# Patient Record
Sex: MALE | Race: OTHER | HISPANIC OR LATINO | URBAN - METROPOLITAN AREA
[De-identification: names, ages, dates, MRNs, and addresses within clinical notes are randomized per-mention and may not be internally consistent; named-entity substitution may affect disease eponyms.]

---

## 2017-11-06 ENCOUNTER — EMERGENCY (EMERGENCY)
Facility: HOSPITAL | Age: 82
LOS: 1 days | Discharge: HOME | End: 2017-11-06
Attending: EMERGENCY MEDICINE
Payer: MEDICARE

## 2017-11-06 VITALS
WEIGHT: 110.01 LBS | OXYGEN SATURATION: 98 % | TEMPERATURE: 98 F | RESPIRATION RATE: 18 BRPM | HEIGHT: 60 IN | DIASTOLIC BLOOD PRESSURE: 75 MMHG | HEART RATE: 69 BPM | SYSTOLIC BLOOD PRESSURE: 126 MMHG

## 2017-11-06 LAB
ALBUMIN SERPL ELPH-MCNC: 4.3 G/DL — SIGNIFICANT CHANGE UP (ref 3.3–5.2)
ALP SERPL-CCNC: 61 U/L — SIGNIFICANT CHANGE UP (ref 40–120)
ALT FLD-CCNC: 8 U/L — SIGNIFICANT CHANGE UP
ANION GAP SERPL CALC-SCNC: 16 MMOL/L — SIGNIFICANT CHANGE UP (ref 5–17)
AST SERPL-CCNC: 16 U/L — SIGNIFICANT CHANGE UP
BASOPHILS # BLD AUTO: 0 K/UL — SIGNIFICANT CHANGE UP (ref 0–0.2)
BASOPHILS NFR BLD AUTO: 0.1 % — SIGNIFICANT CHANGE UP (ref 0–2)
BILIRUB SERPL-MCNC: 0.4 MG/DL — SIGNIFICANT CHANGE UP (ref 0.4–2)
BUN SERPL-MCNC: 31 MG/DL — HIGH (ref 8–20)
CALCIUM SERPL-MCNC: 9.7 MG/DL — SIGNIFICANT CHANGE UP (ref 8.6–10.2)
CHLORIDE SERPL-SCNC: 99 MMOL/L — SIGNIFICANT CHANGE UP (ref 98–107)
CO2 SERPL-SCNC: 25 MMOL/L — SIGNIFICANT CHANGE UP (ref 22–29)
CREAT SERPL-MCNC: 1.94 MG/DL — HIGH (ref 0.5–1.3)
EOSINOPHIL # BLD AUTO: 0 K/UL — SIGNIFICANT CHANGE UP (ref 0–0.5)
EOSINOPHIL NFR BLD AUTO: 0.5 % — SIGNIFICANT CHANGE UP (ref 0–5)
GLUCOSE SERPL-MCNC: 161 MG/DL — HIGH (ref 70–115)
HCT VFR BLD CALC: 30.8 % — LOW (ref 42–52)
HGB BLD-MCNC: 10.4 G/DL — LOW (ref 14–18)
LYMPHOCYTES # BLD AUTO: 1 K/UL — SIGNIFICANT CHANGE UP (ref 1–4.8)
LYMPHOCYTES # BLD AUTO: 9.4 % — LOW (ref 20–55)
MAGNESIUM SERPL-MCNC: 1.7 MG/DL — SIGNIFICANT CHANGE UP (ref 1.6–2.6)
MCHC RBC-ENTMCNC: 30.7 PG — SIGNIFICANT CHANGE UP (ref 27–31)
MCHC RBC-ENTMCNC: 33.8 G/DL — SIGNIFICANT CHANGE UP (ref 32–36)
MCV RBC AUTO: 90.9 FL — SIGNIFICANT CHANGE UP (ref 80–94)
MONOCYTES # BLD AUTO: 0.3 K/UL — SIGNIFICANT CHANGE UP (ref 0–0.8)
MONOCYTES NFR BLD AUTO: 2.9 % — LOW (ref 3–10)
NEUTROPHILS # BLD AUTO: 9.4 K/UL — HIGH (ref 1.8–8)
NEUTROPHILS NFR BLD AUTO: 86.8 % — HIGH (ref 37–73)
PLATELET # BLD AUTO: 240 K/UL — SIGNIFICANT CHANGE UP (ref 150–400)
POTASSIUM SERPL-MCNC: 4.5 MMOL/L — SIGNIFICANT CHANGE UP (ref 3.5–5.3)
POTASSIUM SERPL-SCNC: 4.5 MMOL/L — SIGNIFICANT CHANGE UP (ref 3.5–5.3)
PROT SERPL-MCNC: 7.5 G/DL — SIGNIFICANT CHANGE UP (ref 6.6–8.7)
RBC # BLD: 3.39 M/UL — LOW (ref 4.6–6.2)
RBC # FLD: 13.6 % — SIGNIFICANT CHANGE UP (ref 11–15.6)
SODIUM SERPL-SCNC: 140 MMOL/L — SIGNIFICANT CHANGE UP (ref 135–145)
WBC # BLD: 10.9 K/UL — HIGH (ref 4.8–10.8)
WBC # FLD AUTO: 10.9 K/UL — HIGH (ref 4.8–10.8)

## 2017-11-06 PROCEDURE — 71020: CPT | Mod: 26

## 2017-11-06 PROCEDURE — 99284 EMERGENCY DEPT VISIT MOD MDM: CPT

## 2017-11-06 PROCEDURE — 70450 CT HEAD/BRAIN W/O DYE: CPT | Mod: 26

## 2017-11-06 PROCEDURE — 72131 CT LUMBAR SPINE W/O DYE: CPT | Mod: 26

## 2017-11-06 PROCEDURE — 73030 X-RAY EXAM OF SHOULDER: CPT | Mod: 26,LT

## 2017-11-06 PROCEDURE — 73562 X-RAY EXAM OF KNEE 3: CPT | Mod: 26,LT

## 2017-11-06 RX ORDER — SODIUM CHLORIDE 9 MG/ML
1000 INJECTION INTRAMUSCULAR; INTRAVENOUS; SUBCUTANEOUS
Qty: 0 | Refills: 0 | Status: DISCONTINUED | OUTPATIENT
Start: 2017-11-06 | End: 2017-11-11

## 2017-11-06 RX ORDER — SODIUM CHLORIDE 9 MG/ML
500 INJECTION INTRAMUSCULAR; INTRAVENOUS; SUBCUTANEOUS ONCE
Qty: 0 | Refills: 0 | Status: COMPLETED | OUTPATIENT
Start: 2017-11-06 | End: 2017-11-06

## 2017-11-06 RX ADMIN — SODIUM CHLORIDE 500 MILLILITER(S): 9 INJECTION INTRAMUSCULAR; INTRAVENOUS; SUBCUTANEOUS at 20:39

## 2017-11-06 RX ADMIN — SODIUM CHLORIDE 70 MILLILITER(S): 9 INJECTION INTRAMUSCULAR; INTRAVENOUS; SUBCUTANEOUS at 21:19

## 2017-11-06 NOTE — ED ADULT NURSE REASSESSMENT NOTE - NS ED NURSE REASSESS COMMENT FT1
Pt provided with a meal and refreshment as requested. pt comfortable on stretcher, status unchanged at this time, offers no complaints, pleasant and cooperative to care. safety measures maintained.

## 2017-11-06 NOTE — ED PROVIDER NOTE - OBJECTIVE STATEMENT
87 year old male with PMH BPH, HTN, arthritis presents with generalized weakness, joint pain and frequent falls. pt states that for 2 years he has had chronic left shoulder and knee pain, constant, worse with movement. the pain has been worsening over the past several days. In addition, pt reports that he has had frequent falls over the past several weeks. He states he "feels week" and cannot support himself.  No HA, blurry vision, numbness, tingling, fever, chills, chest pain.

## 2017-11-06 NOTE — ED ADULT TRIAGE NOTE - CHIEF COMPLAINT QUOTE
BIBA, patient reports he feels like he was going to fall today but reports he always feels nauseas and dizzy but today was worse.

## 2017-11-06 NOTE — ED ADULT NURSE REASSESSMENT NOTE - NS ED NURSE REASSESS COMMENT FT1
Report recvd from off going RN, pt recvd lying on stretcher, assisted to restroom as requested, in no apparent distress, pt POC discussed with MD, awaiting results.

## 2017-11-06 NOTE — ED PROVIDER NOTE - PROGRESS NOTE DETAILS
seen by PT seen by PT, social work contacted; patient able to ambulate well with walker; labs, ua and ct imaging reviewed, no acute pathology warranting admission; patient pleasant and cooperative; will arrange home care and hopefully senior transport to get to doctor's office for f/u; medically cleared for d/c, awaiting social work.

## 2017-11-06 NOTE — ED ADULT NURSE REASSESSMENT NOTE - NS ED NURSE REASSESS COMMENT FT1
Pt family at bedside reports pt lives at home with elderly wife who is unable to safely care for pt, reports pt had an aide come to home in past but pt fears that since senior care he will ose all his money paying for the service and aide no longer comes into home, findings reported to CLAIRE TURCIOS discussed with pt and family member who is now at bedside, pt to stay in ED overnight pending PT and Social Work in the AM, pt safety and comfort measures maintained.

## 2017-11-07 VITALS
SYSTOLIC BLOOD PRESSURE: 116 MMHG | OXYGEN SATURATION: 100 % | RESPIRATION RATE: 16 BRPM | TEMPERATURE: 98 F | DIASTOLIC BLOOD PRESSURE: 69 MMHG | HEART RATE: 60 BPM

## 2017-11-07 LAB
APPEARANCE UR: CLEAR — SIGNIFICANT CHANGE UP
BILIRUB UR-MCNC: NEGATIVE — SIGNIFICANT CHANGE UP
COLOR SPEC: YELLOW — SIGNIFICANT CHANGE UP
DIFF PNL FLD: NEGATIVE — SIGNIFICANT CHANGE UP
GLUCOSE UR QL: NEGATIVE MG/DL — SIGNIFICANT CHANGE UP
KETONES UR-MCNC: NEGATIVE — SIGNIFICANT CHANGE UP
LEUKOCYTE ESTERASE UR-ACNC: NEGATIVE — SIGNIFICANT CHANGE UP
NITRITE UR-MCNC: NEGATIVE — SIGNIFICANT CHANGE UP
PH UR: 5 — SIGNIFICANT CHANGE UP (ref 5–8)
PROT UR-MCNC: 15 MG/DL
SP GR SPEC: 1.01 — SIGNIFICANT CHANGE UP (ref 1.01–1.02)
UROBILINOGEN FLD QL: NEGATIVE MG/DL — SIGNIFICANT CHANGE UP

## 2017-11-07 PROCEDURE — 73562 X-RAY EXAM OF KNEE 3: CPT

## 2017-11-07 PROCEDURE — 85027 COMPLETE CBC AUTOMATED: CPT

## 2017-11-07 PROCEDURE — 73030 X-RAY EXAM OF SHOULDER: CPT

## 2017-11-07 PROCEDURE — 70450 CT HEAD/BRAIN W/O DYE: CPT

## 2017-11-07 PROCEDURE — 99284 EMERGENCY DEPT VISIT MOD MDM: CPT | Mod: 25

## 2017-11-07 PROCEDURE — T1013: CPT

## 2017-11-07 PROCEDURE — 83735 ASSAY OF MAGNESIUM: CPT

## 2017-11-07 PROCEDURE — 71046 X-RAY EXAM CHEST 2 VIEWS: CPT

## 2017-11-07 PROCEDURE — 36415 COLL VENOUS BLD VENIPUNCTURE: CPT

## 2017-11-07 PROCEDURE — 81001 URINALYSIS AUTO W/SCOPE: CPT

## 2017-11-07 PROCEDURE — 72131 CT LUMBAR SPINE W/O DYE: CPT

## 2017-11-07 PROCEDURE — 80053 COMPREHEN METABOLIC PANEL: CPT

## 2017-11-07 NOTE — DISCHARGE NOTE ADULT - PATIENT PORTAL LINK FT
“You can access the [employer] Patient Portal, offered by [facility], by registering with the following website: [email or web address]”

## 2017-11-07 NOTE — ED ADULT NURSE REASSESSMENT NOTE - NS ED NURSE REASSESS COMMENT FT1
Pt seen by PT , cleared for discharge home , SW at the bedside earlier, pt will be going home with HHA set up by JEISON, awaiting nephew to come for discharge

## 2017-11-07 NOTE — PROVIDER CONTACT NOTE (OTHER) - ASSESSMENT
Pt is functionally at baseline level of function, and not in need of skilled PT, will no longer follow . Pt left supine in bed in no apparent distress and call bell within reach.

## 2017-11-07 NOTE — PHYSICAL THERAPY INITIAL EVALUATION ADULT - ADDITIONAL COMMENTS
Pt lives in a 2 story house with no steps to enter.  Amb with RW and Modified Independent with ADLs and self care

## 2017-11-07 NOTE — ED ADULT NURSE REASSESSMENT NOTE - NS ED NURSE REASSESS COMMENT FT1
Pt resting comfortably on stretcher, offers no complaints at this time, comfort measures provided including a warm blanket, safety measures maintained. Pt encouraged to sleep awaiting PT consult in am.  POC explained to patient via . Pt verbalized understanding.

## 2017-11-07 NOTE — ED BEHAVIORAL HEALTH NOTE - BEHAVIORAL HEALTH NOTE
Social work note:  Worker consulted by Dr. Hendrix. Dr. Hendrix concerned about how pt has been getting to doctors appointments.  Pt resides with his family but reports they are not always available to assist. Worker provided pt with [facility] as well as medicaid transportation application for pt's PCP to complete. Pt provided verbal understanding. No other social work needs at this time.

## 2017-11-07 NOTE — PHYSICAL THERAPY INITIAL EVALUATION ADULT - REHAB POTENTIAL, PT EVAL
Pt is functionally at baseline level of function and not in need of skilled PT, will no longer follow/none

## 2018-05-01 ENCOUNTER — OUTPATIENT (OUTPATIENT)
Dept: OUTPATIENT SERVICES | Facility: HOSPITAL | Age: 83
LOS: 1 days | End: 2018-05-01
Payer: MEDICAID

## 2018-05-21 ENCOUNTER — INPATIENT (INPATIENT)
Facility: HOSPITAL | Age: 83
LOS: 1 days | Discharge: HOME | DRG: 310 | End: 2018-05-23
Attending: INTERNAL MEDICINE | Admitting: INTERNAL MEDICINE
Payer: MEDICARE

## 2018-05-21 VITALS
OXYGEN SATURATION: 98 % | HEIGHT: 67 IN | TEMPERATURE: 99 F | SYSTOLIC BLOOD PRESSURE: 135 MMHG | HEART RATE: 49 BPM | WEIGHT: 145.06 LBS | DIASTOLIC BLOOD PRESSURE: 92 MMHG | RESPIRATION RATE: 20 BRPM

## 2018-05-21 DIAGNOSIS — R00.1 BRADYCARDIA, UNSPECIFIED: ICD-10-CM

## 2018-05-21 DIAGNOSIS — N18.3 CHRONIC KIDNEY DISEASE, STAGE 3 (MODERATE): ICD-10-CM

## 2018-05-21 DIAGNOSIS — R55 SYNCOPE AND COLLAPSE: ICD-10-CM

## 2018-05-21 DIAGNOSIS — I10 ESSENTIAL (PRIMARY) HYPERTENSION: ICD-10-CM

## 2018-05-21 DIAGNOSIS — D64.9 ANEMIA, UNSPECIFIED: ICD-10-CM

## 2018-05-21 LAB
ANION GAP SERPL CALC-SCNC: 15 MMOL/L — SIGNIFICANT CHANGE UP (ref 5–17)
APTT BLD: 28.6 SEC — SIGNIFICANT CHANGE UP (ref 27.5–37.4)
BUN SERPL-MCNC: 31 MG/DL — HIGH (ref 8–20)
CALCIUM SERPL-MCNC: 9.3 MG/DL — SIGNIFICANT CHANGE UP (ref 8.6–10.2)
CHLORIDE SERPL-SCNC: 98 MMOL/L — SIGNIFICANT CHANGE UP (ref 98–107)
CK SERPL-CCNC: 119 U/L — SIGNIFICANT CHANGE UP (ref 30–200)
CK SERPL-CCNC: 95 U/L — SIGNIFICANT CHANGE UP (ref 30–200)
CO2 SERPL-SCNC: 22 MMOL/L — SIGNIFICANT CHANGE UP (ref 22–29)
CREAT SERPL-MCNC: 1.32 MG/DL — HIGH (ref 0.5–1.3)
GLUCOSE SERPL-MCNC: 142 MG/DL — HIGH (ref 70–115)
HCT VFR BLD CALC: 31.3 % — LOW (ref 42–52)
HGB BLD-MCNC: 10.1 G/DL — LOW (ref 14–18)
INR BLD: 1.01 RATIO — SIGNIFICANT CHANGE UP (ref 0.88–1.16)
MAGNESIUM SERPL-MCNC: 1.9 MG/DL — SIGNIFICANT CHANGE UP (ref 1.8–2.6)
MCHC RBC-ENTMCNC: 30.4 PG — SIGNIFICANT CHANGE UP (ref 27–31)
MCHC RBC-ENTMCNC: 32.3 G/DL — SIGNIFICANT CHANGE UP (ref 32–36)
MCV RBC AUTO: 94.3 FL — HIGH (ref 80–94)
PLATELET # BLD AUTO: 264 K/UL — SIGNIFICANT CHANGE UP (ref 150–400)
POTASSIUM SERPL-MCNC: 4.4 MMOL/L — SIGNIFICANT CHANGE UP (ref 3.5–5.3)
POTASSIUM SERPL-SCNC: 4.4 MMOL/L — SIGNIFICANT CHANGE UP (ref 3.5–5.3)
PROTHROM AB SERPL-ACNC: 11.1 SEC — SIGNIFICANT CHANGE UP (ref 9.8–12.7)
RBC # BLD: 3.32 M/UL — LOW (ref 4.6–6.2)
RBC # FLD: 14 % — SIGNIFICANT CHANGE UP (ref 11–15.6)
SODIUM SERPL-SCNC: 135 MMOL/L — SIGNIFICANT CHANGE UP (ref 135–145)
TROPONIN T SERPL-MCNC: 0.01 NG/ML — SIGNIFICANT CHANGE UP (ref 0–0.06)
TROPONIN T SERPL-MCNC: 0.02 NG/ML — SIGNIFICANT CHANGE UP (ref 0–0.06)
TSH SERPL-MCNC: 1.97 UIU/ML — SIGNIFICANT CHANGE UP (ref 0.27–4.2)
WBC # BLD: 6.8 K/UL — SIGNIFICANT CHANGE UP (ref 4.8–10.8)
WBC # FLD AUTO: 6.8 K/UL — SIGNIFICANT CHANGE UP (ref 4.8–10.8)

## 2018-05-21 PROCEDURE — 99223 1ST HOSP IP/OBS HIGH 75: CPT | Mod: AI

## 2018-05-21 PROCEDURE — 93306 TTE W/DOPPLER COMPLETE: CPT | Mod: 26

## 2018-05-21 PROCEDURE — 70450 CT HEAD/BRAIN W/O DYE: CPT | Mod: 26

## 2018-05-21 PROCEDURE — 99223 1ST HOSP IP/OBS HIGH 75: CPT

## 2018-05-21 PROCEDURE — 99291 CRITICAL CARE FIRST HOUR: CPT

## 2018-05-21 PROCEDURE — 71045 X-RAY EXAM CHEST 1 VIEW: CPT | Mod: 26

## 2018-05-21 RX ORDER — TAMSULOSIN HYDROCHLORIDE 0.4 MG/1
0.4 CAPSULE ORAL AT BEDTIME
Qty: 0 | Refills: 0 | Status: DISCONTINUED | OUTPATIENT
Start: 2018-05-21 | End: 2018-05-23

## 2018-05-21 RX ORDER — TRAMADOL HYDROCHLORIDE 50 MG/1
25 TABLET ORAL
Qty: 0 | Refills: 0 | COMMUNITY

## 2018-05-21 RX ORDER — TRAMADOL HYDROCHLORIDE 50 MG/1
25 TABLET ORAL
Qty: 0 | Refills: 0 | Status: DISCONTINUED | OUTPATIENT
Start: 2018-05-21 | End: 2018-05-23

## 2018-05-21 RX ORDER — SODIUM CHLORIDE 9 MG/ML
1000 INJECTION INTRAMUSCULAR; INTRAVENOUS; SUBCUTANEOUS
Qty: 0 | Refills: 0 | Status: DISCONTINUED | OUTPATIENT
Start: 2018-05-21 | End: 2018-05-23

## 2018-05-21 RX ORDER — HEPARIN SODIUM 5000 [USP'U]/ML
5000 INJECTION INTRAVENOUS; SUBCUTANEOUS EVERY 8 HOURS
Qty: 0 | Refills: 0 | Status: DISCONTINUED | OUTPATIENT
Start: 2018-05-21 | End: 2018-05-23

## 2018-05-21 RX ORDER — LISINOPRIL 2.5 MG/1
20 TABLET ORAL DAILY
Qty: 0 | Refills: 0 | Status: DISCONTINUED | OUTPATIENT
Start: 2018-05-21 | End: 2018-05-23

## 2018-05-21 RX ORDER — ACETAMINOPHEN 500 MG
650 TABLET ORAL EVERY 6 HOURS
Qty: 0 | Refills: 0 | Status: DISCONTINUED | OUTPATIENT
Start: 2018-05-21 | End: 2018-05-23

## 2018-05-21 RX ORDER — SODIUM CHLORIDE 9 MG/ML
3 INJECTION INTRAMUSCULAR; INTRAVENOUS; SUBCUTANEOUS ONCE
Qty: 0 | Refills: 0 | Status: COMPLETED | OUTPATIENT
Start: 2018-05-21 | End: 2018-05-21

## 2018-05-21 RX ORDER — AMLODIPINE BESYLATE 2.5 MG/1
10 TABLET ORAL DAILY
Qty: 0 | Refills: 0 | Status: DISCONTINUED | OUTPATIENT
Start: 2018-05-21 | End: 2018-05-23

## 2018-05-21 RX ADMIN — SODIUM CHLORIDE 3 MILLILITER(S): 9 INJECTION INTRAMUSCULAR; INTRAVENOUS; SUBCUTANEOUS at 12:43

## 2018-05-21 RX ADMIN — HEPARIN SODIUM 5000 UNIT(S): 5000 INJECTION INTRAVENOUS; SUBCUTANEOUS at 14:23

## 2018-05-21 RX ADMIN — SODIUM CHLORIDE 75 MILLILITER(S): 9 INJECTION INTRAMUSCULAR; INTRAVENOUS; SUBCUTANEOUS at 18:44

## 2018-05-21 NOTE — CONSULT NOTE ADULT - PROBLEM SELECTOR RECOMMENDATION 9
NPO after midnight. Nuclear stress test   Transthoracic echocardiogram   loop recorder placemant tomorrow if work up is negative.   overnight telemetry.  IV fluids.

## 2018-05-21 NOTE — H&P ADULT - ASSESSMENT
87 yo M w/ hx HTN, osteoarthritis presents for syncopal episode at home. Found to have symptomatic bradycardia.

## 2018-05-21 NOTE — ED PROVIDER NOTE - OBJECTIVE STATEMENT
87 y/o M pt with a hx fof HTN presents to the ED with c/o syncopal episode today. As per son, Pt was eating his breakfast, started feeling nauseous and vomited. After vomiting, pt sat down and "had a blank stare", he passed out and was falling off his chair. Pt however doesn't recall the episode of vomiting. Pt is also experiencing dizziness and CP which he states he has been experiencing for several days now and has wanted to come in to the ED. Pt states he takes his BP medication every time he feels like his BP is up. No further complaints at this time.

## 2018-05-21 NOTE — ED ADULT NURSE NOTE - OBJECTIVE STATEMENT
Assumed pt care at 1245.  Pt awake, alert and oriented x3 c/o syncopal episode today while eating breakfast, pt states he he was sitting in chair and slid to ground,and episode only lasted for a few moments but does not recall details of event.  Denies injury from fall.  Denies chest pain or shortness of breath.  Moving all extremities well.  Respirations even and unlabored, no edema.  Abdomen soft, nontender, nondistended. No signs of distress at this time.  Sinus cathy on cardiac monitor.

## 2018-05-21 NOTE — ED PROVIDER NOTE - CRITICAL CARE INDICATION, MLM
patient was critically ill... Patient was critically ill with a high probability of imminent or life threatening deterioration. STAT IV, EKG, CT HEAD, XRAY, LAB CARDIO CONSULT ADMIT

## 2018-05-21 NOTE — ED PROVIDER NOTE - MEDICAL DECISION MAKING DETAILS
pt with syncope and bradycardia. likely 2ndary to beta  blocker non-compliance, will check labs and monitor

## 2018-05-21 NOTE — ED PROVIDER NOTE - CRITICAL CARE PROVIDED
45 MINUTES/direct patient care (not related to procedure)/documentation/consultation with other physicians/interpretation of diagnostic studies

## 2018-05-21 NOTE — H&P ADULT - HISTORY OF PRESENT ILLNESS
87 yo M w/ hx HTN, osteoarthritis presents for syncopal episode at home.  patient is poor historian and with  states he passed out during breakfast and was unresponsive for "2 hours". denies preceding chest pain or shortness of breath.  per son who witnessed event, states patient finished breakfast and was unresponsive in chair for 2 minutes with post ictal state. no loss of bowel/urine control or tongue biting/seizure like activity.  patient is on atenolol 100mg daily and combination diuretic (triamterene-HCTZ).

## 2018-05-21 NOTE — ED ADULT NURSE REASSESSMENT NOTE - NS ED NURSE REASSESS COMMENT FT1
Report given to MANUEL Felix, prior to going to HR pt transported to ECHO  on monitor, accompanied by radiology RN.  Will bring to holding room when test is completed.

## 2018-05-21 NOTE — CONSULT NOTE ADULT - SUBJECTIVE AND OBJECTIVE BOX
[place] [facility]                                                        Office: [address] [address]                                                       Telephone: [phone]. Fax:[phone]                                                              CARDIOLOGY CONSULTATION NOTE                                                                                             Consult requested by:  Bandar Toure (Hospitalist)  Reason for Consultation: syncope and bradycardia    History obtained by: Patient and medical record     obtained: No    Chief complaint:    Patient is a 88y old  Male who presents with a chief complaint of syncope (21 May 2018 13:48)      HPI:  87 yo M w/ hx HTN, osteoarthritis presents for syncopal episode at home.  patient is poor historian and with  states he passed out during breakfast and was unresponsive for "2 hours". denies preceding chest pain or shortness of breath.  per son who witnessed event, states patient finished breakfast and was unresponsive in chair for 2 minutes with post ictal state. no loss of bowel/urine control or tongue biting/seizure like activity.  patient is on atenolol 100mg daily and combination diuretic (triamterene-HCTZ). (21 May 2018 13:48)    Above history reviewed and agreed.   This is a 88 year old male with ..htn and osteoarthritis, with syncope.        REVIEW OF SYMPTOMS: Cardiovascular:  See HPI. No chest pain,  No dyspnea,  +  syncope,  No palpitations,+  dizziness, No Orthopnea,      No Paroxsymal nocturnal dyspnea;  Respiratory:  No Dyspnea, No cough,     Genitourinary:  No dysuria, no hematuria; Gastrointestinal:  No nausea, no vomiting. No diarrhea.  No abdominal pain. No dark color stool, no melena ; Neurological: No headache, no dizziness, no slurred speech;  Psychiatric: No agitation, no anxiety.  ALL OTHER REVIEW OF SYSTEMS ARE NEGATIVE.    ALLERGIES: Allergies    latex (Unknown)  No Known Drug Allergies    Intolerances          CURRENT MEDICATIONS:  amLODIPine   Tablet 10 milliGRAM(s) Oral daily  lisinopril 20 milliGRAM(s) Oral daily  tamsulosin 0.4 milliGRAM(s) Oral at bedtime     heparin  Injectable      HOME MEDICATIONS:    PAST MEDICAL HISTORY  BPH (benign prostatic hyperplasia)  Gastritis  Hypertension  Arthritis      PAST SURGICAL HISTORY  S/P cholecystectomy      FAMILY HISTORY:  No pertinent family history in first degree relatives      SOCIAL HISTORY:  Denies smoking/alcohol/drugs      Vital Signs Last 24 Hrs  T(C): 37.1 (21 May 2018 11:21), Max: 37.1 (21 May 2018 11:21)  T(F): 98.8 (21 May 2018 11:21), Max: 98.8 (21 May 2018 11:21)  HR: 47 (21 May 2018 12:41) (47 - 49)  BP: 135/92 (21 May 2018 11:21) (135/92 - 135/92)  BP(mean): --  RR: 20 (21 May 2018 11:21) (20 - 20)  SpO2: 98% (21 May 2018 11:21) (98% - 98%)      PHYSICAL EXAM:  Constitutional: Comfortable . No acute distress.   HEENT: Atraumatic and normcephalic , neck is supple . no JVD. No carotid bruit. PEERL   CNS: A&Ox3. No focal deficits. EOMI. Cranial nerves II-IX are intact.   Lymph Nodes: Cervical : Not palpable.  Respiratory: CTAB  Cardiovascular: S1S2 RRR. No murmur/rubs or gallop.  Gastrointestinal: Soft non-tender and non distended . +Bowel sounds. negative Polanco's sign.  Extremities: No edema.   Psychiatric: Calm . no agitation.  Skin: No skin rash/ulcers visualized to face, hands or feet.    Intake and output:     LABS:                        10.1   6.8   )-----------( 264      ( 21 May 2018 12:01 )             31.3     05-21    135  |  98  |  31.0<H>  ----------------------------<  142<H>  4.4   |  22.0  |  1.32<H>    Ca    9.3      21 May 2018 12:01      CARDIAC MARKERS ( 21 May 2018 12:01 )  x     / 0.02 ng/mL / 95 U/L / x     / x        ;p-BNP=  PT/INR - ( 21 May 2018 12:01 )   PT: 11.1 sec;   INR: 1.01 ratio         PTT - ( 21 May 2018 12:01 )  PTT:28.6 sec      INTERPRETATION OF TELEMETRY: Reviewed by me.   ECG: Reviewed by me. Sinus bradycardia.     RADIOLOGY & ADDITIONAL STUDIES:    X-ray:  reviewed by me.  unremarkable [place] [facility]                                                        Office: [address] [address]                                                       Telephone: [phone]. Fax:[phone]                                                              CARDIOLOGY CONSULTATION NOTE                                                                                             Consult requested by:  Bandar Toure (Hospitalist)  Reason for Consultation: syncope and bradycardia    History obtained by: Patient and medical record     obtained: .    Chief complaint:    Patient is a 88y old  Male who presents with a chief complaint of syncope (21 May 2018 13:48)      HPI:  89 yo M w/ hx HTN, osteoarthritis presents for syncopal episode at home.  patient is poor historian and with  states he passed out during breakfast and was unresponsive for "2 hours". denies preceding chest pain or shortness of breath.  per son who witnessed event, states patient finished breakfast and was unresponsive in chair for 2 minutes with post ictal state. no loss of bowel/urine control or tongue biting/seizure like activity.  patient is on atenolol 100mg daily and combination diuretic (triamterene-HCTZ). (21 May 2018 13:48)    Above history reviewed and agreed.   This is a 88 year old male with ..htn and osteoarthritis, with syncope.  Patient stats he has been losing weight and lost > 15 lbs. decrease apetitie.  family history of cancer.    Denies any chest pain on exertion. no dyspnea on exertion.         REVIEW OF SYMPTOMS: Cardiovascular:  See HPI. No chest pain,  No dyspnea,  +  syncope,  No palpitations,+  dizziness, No Orthopnea,      No Paroxsymal nocturnal dyspnea;  Respiratory:  No Dyspnea, No cough,     Genitourinary:  No dysuria, no hematuria; Gastrointestinal:  No nausea, no vomiting. No diarrhea.  No abdominal pain. No dark color stool, no melena ; Neurological: No headache, no dizziness, no slurred speech;  Psychiatric: No agitation, no anxiety.  ALL OTHER REVIEW OF SYSTEMS ARE NEGATIVE.    ALLERGIES: Allergies    latex (Unknown)  No Known Drug Allergies    Intolerances          CURRENT MEDICATIONS:  amLODIPine   Tablet 10 milliGRAM(s) Oral daily  lisinopril 20 milliGRAM(s) Oral daily  tamsulosin 0.4 milliGRAM(s) Oral at bedtime     heparin  Injectable      HOME MEDICATIONS:    PAST MEDICAL HISTORY  BPH (benign prostatic hyperplasia)  Gastritis  Hypertension  Arthritis      PAST SURGICAL HISTORY  S/P cholecystectomy      FAMILY HISTORY:  No pertinent family history in first degree relatives      SOCIAL HISTORY:  Denies smoking/alcohol/drugs      Vital Signs Last 24 Hrs  T(C): 37.1 (21 May 2018 11:21), Max: 37.1 (21 May 2018 11:21)  T(F): 98.8 (21 May 2018 11:21), Max: 98.8 (21 May 2018 11:21)  HR: 47 (21 May 2018 12:41) (47 - 49)  BP: 135/92 (21 May 2018 11:21) (135/92 - 135/92)  BP(mean): --  RR: 20 (21 May 2018 11:21) (20 - 20)  SpO2: 98% (21 May 2018 11:21) (98% - 98%)      PHYSICAL EXAM:  Constitutional: Comfortable . No acute distress.   HEENT: Atraumatic and normcephalic , neck is supple . no JVD. No carotid bruit. PEERL  cachetic malnourished.   CNS: A&Ox3. No focal deficits. EOMI. Cranial nerves II-IX are intact.   Lymph Nodes: Cervical : Not palpable.  Respiratory: CTAB  Cardiovascular: S1S2 RRR. No murmur/rubs or gallop.  Gastrointestinal: Soft non-tender and non distended . +Bowel sounds. negative Polanco's sign.  Extremities: No edema.   Psychiatric: Calm . no agitation.  Skin: No skin rash/ulcers visualized to face, hands or feet.    Intake and output:     LABS:                        10.1   6.8   )-----------( 264      ( 21 May 2018 12:01 )             31.3     05-21    135  |  98  |  31.0<H>  ----------------------------<  142<H>  4.4   |  22.0  |  1.32<H>    Ca    9.3      21 May 2018 12:01      CARDIAC MARKERS ( 21 May 2018 12:01 )  x     / 0.02 ng/mL / 95 U/L / x     / x        ;p-BNP=  PT/INR - ( 21 May 2018 12:01 )   PT: 11.1 sec;   INR: 1.01 ratio         PTT - ( 21 May 2018 12:01 )  PTT:28.6 sec      INTERPRETATION OF TELEMETRY: Reviewed by me.   ECG: Reviewed by me. Sinus bradycardia.     RADIOLOGY & ADDITIONAL STUDIES:    X-ray:  reviewed by me.  unremarkable

## 2018-05-21 NOTE — CONSULT NOTE ADULT - ASSESSMENT
87 yo M w/ hx HTN, osteoarthritis presents for syncopal episode at home with nause and vomiting and gernalized body aches

## 2018-05-21 NOTE — ED ADULT TRIAGE NOTE - CHIEF COMPLAINT QUOTE
Patient brought in by ambulance A/Ox3, reports syncopal episode this morning will eating breakfast, pt. hypotensive.

## 2018-05-22 LAB
ANION GAP SERPL CALC-SCNC: 14 MMOL/L — SIGNIFICANT CHANGE UP (ref 5–17)
APPEARANCE UR: CLEAR — SIGNIFICANT CHANGE UP
BILIRUB UR-MCNC: NEGATIVE — SIGNIFICANT CHANGE UP
BUN SERPL-MCNC: 24 MG/DL — HIGH (ref 8–20)
CALCIUM SERPL-MCNC: 8.4 MG/DL — LOW (ref 8.6–10.2)
CHLORIDE SERPL-SCNC: 100 MMOL/L — SIGNIFICANT CHANGE UP (ref 98–107)
CO2 SERPL-SCNC: 22 MMOL/L — SIGNIFICANT CHANGE UP (ref 22–29)
COLOR SPEC: YELLOW — SIGNIFICANT CHANGE UP
CREAT SERPL-MCNC: 0.99 MG/DL — SIGNIFICANT CHANGE UP (ref 0.5–1.3)
DIFF PNL FLD: NEGATIVE — SIGNIFICANT CHANGE UP
FERRITIN SERPL-MCNC: 273 NG/ML — SIGNIFICANT CHANGE UP (ref 30–400)
GLUCOSE SERPL-MCNC: 96 MG/DL — SIGNIFICANT CHANGE UP (ref 70–115)
GLUCOSE UR QL: NEGATIVE MG/DL — SIGNIFICANT CHANGE UP
HCT VFR BLD CALC: 30.2 % — LOW (ref 42–52)
HGB BLD-MCNC: 9.8 G/DL — LOW (ref 14–18)
IRON SATN MFR SERPL: 25 % — SIGNIFICANT CHANGE UP (ref 16–55)
IRON SATN MFR SERPL: 58 UG/DL — LOW (ref 59–158)
KETONES UR-MCNC: NEGATIVE — SIGNIFICANT CHANGE UP
LEUKOCYTE ESTERASE UR-ACNC: NEGATIVE — SIGNIFICANT CHANGE UP
MCHC RBC-ENTMCNC: 30.6 PG — SIGNIFICANT CHANGE UP (ref 27–31)
MCHC RBC-ENTMCNC: 32.5 G/DL — SIGNIFICANT CHANGE UP (ref 32–36)
MCV RBC AUTO: 94.4 FL — HIGH (ref 80–94)
NITRITE UR-MCNC: NEGATIVE — SIGNIFICANT CHANGE UP
PH UR: 7 — SIGNIFICANT CHANGE UP (ref 5–8)
PLATELET # BLD AUTO: 258 K/UL — SIGNIFICANT CHANGE UP (ref 150–400)
POTASSIUM SERPL-MCNC: 3.6 MMOL/L — SIGNIFICANT CHANGE UP (ref 3.5–5.3)
POTASSIUM SERPL-SCNC: 3.6 MMOL/L — SIGNIFICANT CHANGE UP (ref 3.5–5.3)
PROT UR-MCNC: NEGATIVE MG/DL — SIGNIFICANT CHANGE UP
RBC # BLD: 3.2 M/UL — LOW (ref 4.6–6.2)
RBC # FLD: 13.4 % — SIGNIFICANT CHANGE UP (ref 11–15.6)
SODIUM SERPL-SCNC: 136 MMOL/L — SIGNIFICANT CHANGE UP (ref 135–145)
SP GR SPEC: 1 — LOW (ref 1.01–1.02)
TIBC SERPL-MCNC: 235 UG/DL — SIGNIFICANT CHANGE UP (ref 220–430)
TRANSFERRIN SERPL-MCNC: 164 MG/DL — LOW (ref 180–329)
UROBILINOGEN FLD QL: NEGATIVE MG/DL — SIGNIFICANT CHANGE UP
VIT B12 SERPL-MCNC: 318 PG/ML — SIGNIFICANT CHANGE UP (ref 232–1245)
WBC # BLD: 7.1 K/UL — SIGNIFICANT CHANGE UP (ref 4.8–10.8)
WBC # FLD AUTO: 7.1 K/UL — SIGNIFICANT CHANGE UP (ref 4.8–10.8)

## 2018-05-22 PROCEDURE — 78452 HT MUSCLE IMAGE SPECT MULT: CPT | Mod: 26

## 2018-05-22 PROCEDURE — 99223 1ST HOSP IP/OBS HIGH 75: CPT

## 2018-05-22 PROCEDURE — 93016 CV STRESS TEST SUPVJ ONLY: CPT

## 2018-05-22 PROCEDURE — 93018 CV STRESS TEST I&R ONLY: CPT

## 2018-05-22 PROCEDURE — 99233 SBSQ HOSP IP/OBS HIGH 50: CPT

## 2018-05-22 RX ORDER — HALOPERIDOL DECANOATE 100 MG/ML
1 INJECTION INTRAMUSCULAR ONCE
Qty: 0 | Refills: 0 | Status: COMPLETED | OUTPATIENT
Start: 2018-05-22 | End: 2018-05-22

## 2018-05-22 RX ORDER — PREGABALIN 225 MG/1
1000 CAPSULE ORAL DAILY
Qty: 0 | Refills: 0 | Status: DISCONTINUED | OUTPATIENT
Start: 2018-05-22 | End: 2018-05-23

## 2018-05-22 RX ADMIN — HEPARIN SODIUM 5000 UNIT(S): 5000 INJECTION INTRAVENOUS; SUBCUTANEOUS at 13:41

## 2018-05-22 RX ADMIN — SODIUM CHLORIDE 75 MILLILITER(S): 9 INJECTION INTRAMUSCULAR; INTRAVENOUS; SUBCUTANEOUS at 05:29

## 2018-05-22 RX ADMIN — HEPARIN SODIUM 5000 UNIT(S): 5000 INJECTION INTRAVENOUS; SUBCUTANEOUS at 00:05

## 2018-05-22 RX ADMIN — HEPARIN SODIUM 5000 UNIT(S): 5000 INJECTION INTRAVENOUS; SUBCUTANEOUS at 05:29

## 2018-05-22 RX ADMIN — TAMSULOSIN HYDROCHLORIDE 0.4 MILLIGRAM(S): 0.4 CAPSULE ORAL at 00:05

## 2018-05-22 RX ADMIN — HALOPERIDOL DECANOATE 1 MILLIGRAM(S): 100 INJECTION INTRAMUSCULAR at 23:17

## 2018-05-22 RX ADMIN — LISINOPRIL 20 MILLIGRAM(S): 2.5 TABLET ORAL at 05:29

## 2018-05-22 RX ADMIN — AMLODIPINE BESYLATE 10 MILLIGRAM(S): 2.5 TABLET ORAL at 05:29

## 2018-05-22 NOTE — CONSULT NOTE ADULT - SUBJECTIVE AND OBJECTIVE BOX
HPI:  87 yo M seen with son at bedside; h/o HTN, rheumatoid arthritis, possible remote PCI at [facility] (per son - details unclear) presents for syncopal episode at home while eating breakfast. The event was witnessed by his son, who states patient suddenly slumped over in his chair and was unresponsive for approximately 2 minutes. 911 was called and patient was brought to [facility] ED via ambulance. On presentation, the patient was noted to be in sinus rhythm at 40s bpm w/ intact conduction. Per son, denies prodrome and no loss of bowel/urine control or tongue biting/seizure like activity were observed. Patient was on atenolol 100mg daily and combination diuretic (triamterene-HCTZ) - his son has concerns that he may mix up medications at times. Patient notes occasional palpitation, described as brief fluttering, sometimes associated with dizziness. Also notes recent weight loss (unintentional) >15 lbs 2/2 decreased appetite. Denies chest pain, shortness of breath at rest, prior episodes of near/true syncope, or other cardiac symptoms. Pt is somewhat tangential and very concerned about roommates cough. EP is consulted for possible ILR implant.     Tele (since admission to 4 TWR this AM): sinus rhythm at 50s-70s bpm w/ intact conduction; no pauses, arrhythmias or other acute changes.     PAST MEDICAL & SURGICAL HISTORY:  BPH (benign prostatic hyperplasia)  Gastritis  Hypertension  Arthritis  S/P cholecystectomy      REVIEW OF SYSTEMS:  CONSTITUTIONAL: see HPI  EYES: No eye pain, visual disturbances, or discharge  ENMT:  No difficulty hearing, tinnitus, vertigo; No sinus or throat pain  NECK: No pain or stiffness  BREASTS: No pain, masses, or nipple discharge  RESPIRATORY: No cough, wheezing, chills or hemoptysis; No shortness of breath  CARDIOVASCULAR: see HPI  GASTROINTESTINAL: No abdominal or epigastric pain. No nausea, vomiting, or hematemesis; No diarrhea or constipation. No melena or hematochezia.  GENITOURINARY: No dysuria, frequency, hematuria, or incontinence  NEUROLOGICAL: No headaches, memory loss, loss of strength, numbness, or tremors  SKIN: No itching, burning, rashes, or lesions   LYMPH NODES: No enlarged glands  ENDOCRINE: No heat or cold intolerance; No hair loss  MUSCULOSKELETAL: No joint pain or swelling; No muscle, back, or extremity pain  PSYCHIATRIC: No depression, anxiety, mood swings, or difficulty sleeping  HEME/LYMPH: No easy bruising, or bleeding gums  ALLERY AND IMMUNOLOGIC: No hives or eczema      MEDICATIONS  (STANDING):  amLODIPine   Tablet 10 milliGRAM(s) Oral daily  cyanocobalamin Injectable 1000 MICROGram(s) SubCutaneous daily  heparin  Injectable 5000 Unit(s) SubCutaneous every 8 hours  lisinopril 20 milliGRAM(s) Oral daily  sodium chloride 0.9%. 1000 milliLiter(s) (75 mL/Hr) IV Continuous <Continuous>  tamsulosin 0.4 milliGRAM(s) Oral at bedtime    MEDICATIONS  (PRN):  acetaminophen   Tablet. 650 milliGRAM(s) Oral every 6 hours PRN Mild Pain (1 - 3) or fever 38C  traMADol 25 milliGRAM(s) Oral four times a day PRN mod -severe pain      Allergies  No Known Allergies    FAMILY HISTORY:  No pertinent family history in first degree relatives      Vital Signs Last 24 Hrs  T(C): 36.6 (22 May 2018 12:25), Max: 37 (22 May 2018 09:59)  T(F): 97.9 (22 May 2018 12:25), Max: 98.6 (22 May 2018 09:59)  HR: 59 (22 May 2018 12:25) (57 - 71)  BP: 134/60 (22 May 2018 12:25) (123/58 - 177/84)  RR: 17 (22 May 2018 12:25) (14 - 20)  SpO2: 100% (22 May 2018 12:25) (95% - 100%)      Physical Exam:  Constitutional: thin/frail appearing, AAOx3, NAD  Neck: supple, No JVD  Cardiovascular: +S1S2 RRR, no murmurs, rubs, gallops   Pulmonary: CTA b/l, unlabored, no wheezes, rales. rhonci  Abdomen: +BS, soft NTND  Extremities: no edema b/l, +distal pulses b/l  Neuro: non focal, speech clear, GUAJARDO x 4      LABS:                        9.8    7.1   )-----------( 258      ( 22 May 2018 11:29 )             30.2   05-22    136  |  100  |  24.0<H>  ----------------------------<  96  3.6   |  22.0  |  0.99    Ca    8.4<L>      22 May 2018 11:29  Mg     1.9           PT/INR - ( 21 May 2018 12:01 )   PT: 11.1 sec;   INR: 1.01 ratio         PTT - ( 21 May 2018 12:01 )  PTT:28.6 secCARDIAC MARKERS ( 21 May 2018 18:26 )  x     / 0.01 ng/mL / 119 U/L / x     / x      CARDIAC MARKERS ( 21 May 2018 12:01 )  x     / 0.02 ng/mL / 95 U/L / x     / x          Urinalysis Basic - ( 22 May 2018 10:52 )    Color: Yellow / Appearance: Clear / S.005 / pH: x  Gluc: x / Ketone: Negative  / Bili: Negative / Urobili: Negative mg/dL   Blood: x / Protein: Negative mg/dL / Nitrite: Negative   Leuk Esterase: Negative / RBC: x / WBC x   Sq Epi: x / Non Sq Epi: x / Bacteria: x        RADIOLOGY & ADDITIONAL STUDIES:  < from: TTE Echo Complete w/Doppler (18 @ 15:32) >  Summary:   1. Normal biventricular systolic function. Left ventricular ejection   fraction, by visual estimation, is 65 to 70%.   2. Spectral Doppler shows impaired relaxation pattern of left   ventricular myocardial filling (Grade I diastolic dysfunction).   3. No significant valvular abnormalities.   4. No pericardial effusion.    [id number] Trinity Conway DO, Electronically signed on 2018 at   6:49:11 PM    < end of copied text >      < from: Nuclear Stress Test-Pharmacologic (18 @ 10:48) >  IMPRESSIONS:Abnormal; Mild iscghemic Study  Inability to exercise due to decrease exercise capacity.  No cardiac symptoms. No diagnostic ECG changes.  The left ventricle was normal in size.  Mild ischemia in left circumflex territory.  Gated wall motion analysis shows normal wall motion with  LVEF of 65%.  ------------------------------------------------------------------------      ------------------------------------------------------------------------    Confirmed on  2018 - 15:29:48 by Dimitris Baeza MD    Cardiology Fellow: Reji GROE    < end of copied text >

## 2018-05-22 NOTE — PROGRESS NOTE ADULT - PROBLEM SELECTOR PLAN 1
likely dehydration.  Bradycardia : no significant events on telemetry  Stress test: mild ischemia. Medical therapy for now. aspirin 81 and lipitor 20 mg daily.   Plan for Loop recorder placement.

## 2018-05-22 NOTE — PROGRESS NOTE ADULT - SUBJECTIVE AND OBJECTIVE BOX
[place] [facility]                                                        Office: [address] [facility], [place] [address]                                                       Telephone: [phone]. Fax:[phone]                                                                             PROGRESS NOTE   Reason for follow up: syncope                             Overnight: No new events.   Update: Negatvie cardiac enzymes. Stress test shows mild ischemia    Subjective: "  i am ok_"   Complains of:  no new symptoms   Review of symptoms: Cardiac:  No chest pain. No dyspnea. No palpitations.  Respiratory: no cough. No dyspnea  Gastrointestinal: No diarrhea. No abdominal pain. No bleeding.     Past medical history: No updates.   Chronic conditions:  Hypertension: controlled.   	  Vitals:  T(C): 36.8 (05-22-18 @ 17:19), Max: 37 (05-22-18 @ 09:59)  HR: 63 (05-22-18 @ 17:19) (57 - 71)  BP: 104/50 (05-22-18 @ 17:19) (104/50 - 177/84)  RR: 18 (05-22-18 @ 17:19) (14 - 20)  SpO2: 100% (05-22-18 @ 17:19) (95% - 100%)  Wt(kg): --  I&O's Summary    21 May 2018 07:01  -  22 May 2018 07:00  --------------------------------------------------------  IN: 75 mL / OUT: 0 mL / NET: 75 mL    22 May 2018 07:01  -  22 May 2018 17:37  --------------------------------------------------------  IN: 0 mL / OUT: 300 mL / NET: -300 mL      Weight (kg): 65.8 (05-21 @ 11:21)    PHYSICAL EXAM:  Appearance: Comfortable. No acute distress  HEENT:  Head and neck: Atraumatic. Normocephalic.  Normal oral mucosa, PERRL, Neck is supple. No JVD, No carotid bruit.   Neurologic: A & O x 3, no focal deficits. EOMI , Cranial nerves are intact.  Lymphatic: No cervical lymphadenopathy  Cardiovascular: Normal S1 S2, No murmur, rubs/gallops. No JVD, No edema  Respiratory: Lungs clear to auscultation  Gastrointestinal:  Soft, Non-tender, + BS  Lower Extremities: No edema  Psychiatry: Patient is calm. No agitation. Mood & affect appropriate  Skin: No rashes/ ecchymoses/cyanosis/ulcers visualized on the face, hands or feet.    CURRENT MEDICATIONS:  amLODIPine   Tablet 10 milliGRAM(s) Oral daily  lisinopril 20 milliGRAM(s) Oral daily  tamsulosin 0.4 milliGRAM(s) Oral at bedtime    cyanocobalamin Injectable  heparin  Injectable  sodium chloride 0.9%.      LABS:	 	  CARDIAC MARKERS ( 21 May 2018 18:26 )  x     / 0.01 ng/mL / 119 U/L / x     / x      p-BNP 21 May 2018 18:26: x    , CARDIAC MARKERS ( 21 May 2018 12:01 )  x     / 0.02 ng/mL / 95 U/L / x     / x      p-BNP 21 May 2018 12:01: x                                9.8    7.1   )-----------( 258      ( 22 May 2018 11:29 )             30.2     05-22    136  |  100  |  24.0<H>  ----------------------------<  96  3.6   |  22.0  |  0.99    Ca    8.4<L>      22 May 2018 11:29  Mg     1.9     05-21      proBNP:   Lipid Profile:   HgA1c: TSH: Thyroid Stimulating Hormone, Serum: 1.97 uIU/mL      TELEMETRY: Reviewed : no events. sinus rhythm,   ECG:  Reviewed by me. 	Sinus rhythm, Non-specific ST- T changes     DIAGNOSTIC TESTING:  [ ] Echocardiogram:  Normal wall motion. Normal EF> Grade I Diastolic dysfunction   [ ] Stress Test:  Mild ischemia in circumflex territory.

## 2018-05-22 NOTE — CONSULT NOTE ADULT - ATTENDING COMMENTS
Pt with syncope of unclear etiology, found to have sinus bradycardia on presentation. Had been on atenolol 100mg qd, with possible medication confusion. HRs now improved. Agree with holding atenolol, and ILR implant for long-term monitoring to evaluate for paroxysmal arrhythmia contributing to syncope if pt is agreeable.   -Monitor on tele overnight  -possible ILR implant tomorrow if pt and family agreeable.
Thank you for allowing me to participate in care of your patient.   Please call as needed.

## 2018-05-22 NOTE — PROGRESS NOTE ADULT - SUBJECTIVE AND OBJECTIVE BOX
seen for bradycardia/syncope    complaining of multiple chronic complaints  (hemorrhoids, chronic abd pain, gen aches/pains)  ros otherwise negative      MEDICATIONS  (STANDING):  amLODIPine   Tablet 10 milliGRAM(s) Oral daily  cyanocobalamin Injectable 1000 MICROGram(s) SubCutaneous daily  heparin  Injectable 5000 Unit(s) SubCutaneous every 8 hours  lisinopril 20 milliGRAM(s) Oral daily  sodium chloride 0.9%. 1000 milliLiter(s) (75 mL/Hr) IV Continuous <Continuous>  tamsulosin 0.4 milliGRAM(s) Oral at bedtime    MEDICATIONS  (PRN):  acetaminophen   Tablet. 650 milliGRAM(s) Oral every 6 hours PRN Mild Pain (1 - 3) or fever 38C  traMADol 25 milliGRAM(s) Oral four times a day PRN mod -severe pain      Allergies    No Known Allergies    Intolerances        Vital Signs Last 24 Hrs  T(C): 36.6 (22 May 2018 12:25), Max: 37 (22 May 2018 09:59)  T(F): 97.9 (22 May 2018 12:25), Max: 98.6 (22 May 2018 09:59)  HR: 59 (22 May 2018 12:25) (57 - 71)  BP: 134/60 (22 May 2018 12:25) (123/58 - 177/84)  BP(mean): --  RR: 17 (22 May 2018 12:25) (14 - 20)  SpO2: 100% (22 May 2018 12:25) (95% - 100%)    PHYSICAL EXAM:    GENERAL: NAD  CHEST/LUNG: Clear to percussion bilatera  HEART: Regular rate and rhythm; S1 S2  ABDOMEN: Soft, Nontender, Nondistended; Bowel sounds present  EXTREMITIES:  no edema  NERVOUS SYSTEM:  Alert & Oriented X3, nonfocal gen weakness  PSYCH: normal mood, appropriate response.    LABS:                        9.8    7.1   )-----------( 258      ( 22 May 2018 11:29 )             30.2     05-    136  |  100  |  24.0<H>  ----------------------------<  96  3.6   |  22.0  |  0.99    Ca    8.4<L>      22 May 2018 11:29  Mg     1.9     -      PT/INR - ( 21 May 2018 12:01 )   PT: 11.1 sec;   INR: 1.01 ratio         PTT - ( 21 May 2018 12:01 )  PTT:28.6 sec  Urinalysis Basic - ( 22 May 2018 10:52 )    Color: Yellow / Appearance: Clear / S.005 / pH: x  Gluc: x / Ketone: Negative  / Bili: Negative / Urobili: Negative mg/dL   Blood: x / Protein: Negative mg/dL / Nitrite: Negative   Leuk Esterase: Negative / RBC: x / WBC x   Sq Epi: x / Non Sq Epi: x / Bacteria: x        CAPILLARY BLOOD GLUCOSE            RADIOLOGY & ADDITIONAL TESTS:

## 2018-05-22 NOTE — PROGRESS NOTE ADULT - ASSESSMENT
89 yo M w/ hx HTN, osteoarthritis presents for syncopal episode at home with nause and vomiting and gernalized body aches

## 2018-05-22 NOTE — PROGRESS NOTE ADULT - ASSESSMENT
89 yo M w/ hx HTN, osteoarthritis presents for syncopal episode at home. Found to have symptomatic bradycardia.

## 2018-05-22 NOTE — CONSULT NOTE ADULT - ASSESSMENT
89 yo M seen with son at bedside; h/o HTN, rheumatoid arthritis, possible remote PCI at [facility] (per son - details unclear) presents for syncopal episode at home while eating breakfast. The event was witnessed by his son, who states patient suddenly slumped over in his chair and was unresponsive for approximately 2 minutes. 911 was called and patient was brought to [facility] ED via ambulance. On presentation, the patient was noted to be in sinus rhythm at 40s bpm w/ intact conduction. Per son, denies prodrome and no loss of bowel/urine control or tongue biting/seizure like activity were observed. Patient was on atenolol 100mg daily and combination diuretic (triamterene-HCTZ) - his son has concerns that he may mix up medications at times. Patient notes occasional palpitation, described as brief fluttering, sometimes associated with dizziness. Also notes recent weight loss (unintentional) >15 lbs 2/2 decreased appetite.    Recommendations:   HR remains w/in acceptable limits since arrival to 4 TWR and following discontinuation of Atenolol.   ILR implant would be reasonable for long term monitoring and correlation of sxs w/ arrhythmias.   Will d/w Dr. Skaggs. Further recs to follow. 87 yo M seen with son at bedside; h/o HTN, rheumatoid arthritis, possible remote PCI at [facility] (per son - details unclear)  now with syncopal episode and associated sinus bradycardia in the setting of atenolol 100mg daily and possible confusion w/ medical regimen resulting in duplicate dosing.     Recommendations:   HR remains w/in acceptable limits since arrival to 4 T[facility] and following discontinuation of Atenolol.   ILR implant would be reasonable for long term monitoring and correlation of sxs w/ arrhythmias.   Will d/w Dr. Skaggs. Further recs to follow. 87 yo M seen with son at bedside; h/o HTN, rheumatoid arthritis, possible remote PCI at [facility] (per son - details unclear)  now with syncopal episode and possibly associated sinus bradycardia in the setting of atenolol 100mg daily and possible confusion w/ medical regimen resulting in duplicate dosing.     Recommendations:   HR remains w/in acceptable limits since arrival to 4 T[facility] and following discontinuation of Atenolol.   ILR implant would be reasonable for long term monitoring and correlation of sxs w/ arrhythmias.   Will d/w Dr. Skaggs. Further recs to follow.

## 2018-05-23 ENCOUNTER — TRANSCRIPTION ENCOUNTER (OUTPATIENT)
Age: 83
End: 2018-05-23

## 2018-05-23 VITALS
TEMPERATURE: 98 F | SYSTOLIC BLOOD PRESSURE: 150 MMHG | RESPIRATION RATE: 18 BRPM | HEART RATE: 68 BPM | DIASTOLIC BLOOD PRESSURE: 70 MMHG | OXYGEN SATURATION: 100 %

## 2018-05-23 LAB
HCT VFR BLD CALC: 31.4 % — LOW (ref 42–52)
HGB BLD-MCNC: 10.5 G/DL — LOW (ref 14–18)
MCHC RBC-ENTMCNC: 31 PG — SIGNIFICANT CHANGE UP (ref 27–31)
MCHC RBC-ENTMCNC: 33.4 G/DL — SIGNIFICANT CHANGE UP (ref 32–36)
MCV RBC AUTO: 92.6 FL — SIGNIFICANT CHANGE UP (ref 80–94)
PLATELET # BLD AUTO: 284 K/UL — SIGNIFICANT CHANGE UP (ref 150–400)
RBC # BLD: 3.39 M/UL — LOW (ref 4.6–6.2)
RBC # FLD: 13.4 % — SIGNIFICANT CHANGE UP (ref 11–15.6)
WBC # BLD: 8.8 K/UL — SIGNIFICANT CHANGE UP (ref 4.8–10.8)
WBC # FLD AUTO: 8.8 K/UL — SIGNIFICANT CHANGE UP (ref 4.8–10.8)

## 2018-05-23 PROCEDURE — 99233 SBSQ HOSP IP/OBS HIGH 50: CPT

## 2018-05-23 PROCEDURE — 82550 ASSAY OF CK (CPK): CPT

## 2018-05-23 PROCEDURE — 99285 EMERGENCY DEPT VISIT HI MDM: CPT | Mod: 25

## 2018-05-23 PROCEDURE — 85027 COMPLETE CBC AUTOMATED: CPT

## 2018-05-23 PROCEDURE — 85610 PROTHROMBIN TIME: CPT

## 2018-05-23 PROCEDURE — 84484 ASSAY OF TROPONIN QUANT: CPT

## 2018-05-23 PROCEDURE — 93005 ELECTROCARDIOGRAM TRACING: CPT

## 2018-05-23 PROCEDURE — 84466 ASSAY OF TRANSFERRIN: CPT

## 2018-05-23 PROCEDURE — 81003 URINALYSIS AUTO W/O SCOPE: CPT

## 2018-05-23 PROCEDURE — 93306 TTE W/DOPPLER COMPLETE: CPT

## 2018-05-23 PROCEDURE — 71045 X-RAY EXAM CHEST 1 VIEW: CPT

## 2018-05-23 PROCEDURE — 70450 CT HEAD/BRAIN W/O DYE: CPT

## 2018-05-23 PROCEDURE — 83735 ASSAY OF MAGNESIUM: CPT

## 2018-05-23 PROCEDURE — 83550 IRON BINDING TEST: CPT

## 2018-05-23 PROCEDURE — 96372 THER/PROPH/DIAG INJ SC/IM: CPT | Mod: XU

## 2018-05-23 PROCEDURE — 82728 ASSAY OF FERRITIN: CPT

## 2018-05-23 PROCEDURE — 36415 COLL VENOUS BLD VENIPUNCTURE: CPT

## 2018-05-23 PROCEDURE — 82607 VITAMIN B-12: CPT

## 2018-05-23 PROCEDURE — T1013: CPT

## 2018-05-23 PROCEDURE — 85730 THROMBOPLASTIN TIME PARTIAL: CPT

## 2018-05-23 PROCEDURE — 80048 BASIC METABOLIC PNL TOTAL CA: CPT

## 2018-05-23 PROCEDURE — 99239 HOSP IP/OBS DSCHRG MGMT >30: CPT

## 2018-05-23 PROCEDURE — 93017 CV STRESS TEST TRACING ONLY: CPT

## 2018-05-23 PROCEDURE — A9500: CPT

## 2018-05-23 PROCEDURE — 84443 ASSAY THYROID STIM HORMONE: CPT

## 2018-05-23 PROCEDURE — 78452 HT MUSCLE IMAGE SPECT MULT: CPT

## 2018-05-23 RX ORDER — AMLODIPINE BESYLATE AND BENAZEPRIL HYDROCHLORIDE 10; 20 MG/1; MG/1
1 CAPSULE ORAL
Qty: 30 | Refills: 0 | OUTPATIENT
Start: 2018-05-23 | End: 2018-06-21

## 2018-05-23 RX ORDER — POLYETHYLENE GLYCOL 3350 17 G/17G
17 POWDER, FOR SOLUTION ORAL
Qty: 375 | Refills: 0 | OUTPATIENT
Start: 2018-05-23 | End: 2018-06-21

## 2018-05-23 RX ORDER — HALOPERIDOL DECANOATE 100 MG/ML
1 INJECTION INTRAMUSCULAR
Qty: 0 | Refills: 0 | Status: DISCONTINUED | OUTPATIENT
Start: 2018-05-23 | End: 2018-05-23

## 2018-05-23 RX ORDER — SENNA PLUS 8.6 MG/1
2 TABLET ORAL
Qty: 60 | Refills: 0 | OUTPATIENT
Start: 2018-05-23 | End: 2018-06-21

## 2018-05-23 RX ORDER — ACETAMINOPHEN 500 MG
2 TABLET ORAL
Qty: 0 | Refills: 0 | COMMUNITY
Start: 2018-05-23

## 2018-05-23 RX ORDER — ATENOLOL 25 MG/1
1 TABLET ORAL
Qty: 0 | Refills: 0 | COMMUNITY

## 2018-05-23 RX ORDER — RISPERIDONE 4 MG/1
0.25 TABLET ORAL
Qty: 0 | Refills: 0 | Status: DISCONTINUED | OUTPATIENT
Start: 2018-05-23 | End: 2018-05-23

## 2018-05-23 RX ADMIN — TRAMADOL HYDROCHLORIDE 25 MILLIGRAM(S): 50 TABLET ORAL at 07:46

## 2018-05-23 RX ADMIN — TRAMADOL HYDROCHLORIDE 25 MILLIGRAM(S): 50 TABLET ORAL at 08:00

## 2018-05-23 NOTE — DISCHARGE NOTE ADULT - MEDICATION SUMMARY - MEDICATIONS TO STOP TAKING
I will STOP taking the medications listed below when I get home from the hospital:    hydrochlorothiazide-triamterene 25 mg-37.5 mg oral tablet  -- 1 tab(s) by mouth once a day I will STOP taking the medications listed below when I get home from the hospital:    hydrochlorothiazide-triamterene 25 mg-37.5 mg oral tablet  -- 1 tab(s) by mouth once a day    atenolol 100 mg oral tablet  -- 1 tab(s) by mouth once a day

## 2018-05-23 NOTE — DISCHARGE NOTE ADULT - MEDICATION SUMMARY - MEDICATIONS TO TAKE
I will START or STAY ON the medications listed below when I get home from the hospital:    traMADol  -- 25 milligram(s) by mouth every 6 hours, As Needed  -- Indication: For pain    acetaminophen 325 mg oral tablet  -- 2 tab(s) by mouth every 6 hours, As needed, Mild Pain (1 - 3) or fever 38C  -- Indication: For pain    Flomax 0.4 mg oral capsule  -- 1 cap(s) by mouth once a day  -- It is very important that you take or use this exactly as directed.  Do not skip doses or discontinue unless directed by your doctor.  May cause drowsiness.  Alcohol may intensify this effect.  Use care when operating dangerous machinery.  Some non-prescription drugs may aggravate your condition.  Read all labels carefully.  If a warning appears, check with your doctor before taking.  Swallow whole.  Do not crush.  Take with food or milk.      -- Indication: For Bph    amLODIPine-benazepril 10 mg-20 mg oral capsule  -- 1 cap(s) by mouth once a day  -- Indication: For hypertension    dicyclomine 10 mg oral capsule  -- 1 tab(s) by mouth 3 times a day, As Needed  -- Indication: For IBS I will START or STAY ON the medications listed below when I get home from the hospital:    acetaminophen 325 mg oral tablet  -- 2 tab(s) by mouth every 6 hours, As needed, Mild Pain (1 - 3) or fever 38C  -- Indication: For pain    traMADol  -- 25 milligram(s) by mouth every 6 hours, As Needed  -- Indication: For pain    Flomax 0.4 mg oral capsule  -- 1 cap(s) by mouth once a day  -- It is very important that you take or use this exactly as directed.  Do not skip doses or discontinue unless directed by your doctor.  May cause drowsiness.  Alcohol may intensify this effect.  Use care when operating dangerous machinery.  Some non-prescription drugs may aggravate your condition.  Read all labels carefully.  If a warning appears, check with your doctor before taking.  Swallow whole.  Do not crush.  Take with food or milk.      -- Indication: For Bph    amLODIPine-benazepril 10 mg-20 mg oral capsule  -- 1 cap(s) by mouth once a day  -- Indication: For hypertension    dicyclomine 10 mg oral capsule  -- 1 tab(s) by mouth 3 times a day, As Needed  -- Indication: For IBS    senna 17 mg oral tablet  -- 2 tab(s) by mouth once a day (at bedtime), As Needed -for constipation   -- Medication should be taken with plenty of water.    -- Indication: For Constipation    MiraLax oral powder for reconstitution  -- 17 gram(s) by mouth once a day   -- Dilute this medication with liquid before administration.  It is very important that you take or use this exactly as directed.  Do not skip doses or discontinue unless directed by your doctor.    -- Indication: For Constipation

## 2018-05-23 NOTE — CHART NOTE - NSCHARTNOTEFT_GEN_A_CORE
Asked to evaluate patient for change in mental status  upon my arrival to the floor patient was sitting at the [facility] staff.  Had a bad night due to agitation  patient given dose of tramadol and was placed on 1:1 for patient safety.  refusing to stay in chair  Ripped off monitor and iv    There was no confusion on evaluation he was just having pain from his RA in his right shoulder and lower back  Vital signs were stable and there was no neurologic deficit    Patient did get relief from tramadol walking with walker and evaluated by PT  then Rn called for another evaluation  patient states he sees double    On exam via a   patient is alert and oriented x 3  He knows the month, season and the president  Asked if he would agree to loop monitor and he has reservation regarding the cost  states he has to go home and get his financial situation in order first  I reassured him he would not have to pay  He states that his visions has been bad for >10 years because he needs cataracts    On exam  b/p 130-150/60-70  Heent neg  Lungs clear  heart rrr  Abd soft non tender  Ext no edema  neuro alert agitates easily  motor strength 5 plus upper and lower   following all commands    89 y/o male with PMH of Hypertension, RA, CAD s/p syncopal episode. Found to be bradycardic and atenolol d/lyndsay .  During monitor no arrhythmias however patient will not allow us to put monitor back on    Delirium without any neurologic deficit -> discussed with daughter via a  who reports no dementia symptoms prior to hospitalization   Asked family to come and provide support for him  1:1 observation for now    B12 def  b12 qd ordered    Syncope  no further bradycardia  loop montior  patient states he will not agree to it at this time

## 2018-05-23 NOTE — PROGRESS NOTE ADULT - SUBJECTIVE AND OBJECTIVE BOX
[place] [facility] Practice                                                        Office: [address] [place] [address]                                                       Telephone: [phone]. Fax:[phone]                                                                             PROGRESS NOTE   Reason for follow up: syncope                             Overnight: altered mental status. Delierum.   Update:  did nto get loop     Subjective: "  i am ok_"   Complains of:  no new symptoms   Review of symptoms: Cardiac:  No chest pain. No dyspnea. No palpitations.  Respiratory: no cough. No dyspnea  Gastrointestinal: No diarrhea. No abdominal pain. No bleeding.     Past medical history: No updates.   Chronic conditions:  Hypertension: controlled.   	  Vitals:  Vital Signs Last 24 Hrs  T(C): 36.8 (05-23-18 @ 15:52), Max: 36.8 (05-23-18 @ 15:52)  T(F): 98.2 (05-23-18 @ 15:52), Max: 98.2 (05-23-18 @ 15:52)  HR: 68 (05-23-18 @ 15:52) (68 - 100)  BP: 150/70 (05-23-18 @ 15:52) (132/68 - 150/79)  BP(mean): --  RR: 18 (05-23-18 @ 15:52) (16 - 18)  SpO2: 100% (05-23-18 @ 15:52) (96% - 100%)    PHYSICAL EXAM:  Appearance: Comfortable. No acute distress  HEENT:  Head and neck: Atraumatic. Normocephalic.  Normal oral mucosa, PERRL, Neck is supple. No JVD, No carotid bruit.   Neurologic: A & O x 3, no focal deficits. EOMI , Cranial nerves are intact.  Lymphatic: No cervical lymphadenopathy  Cardiovascular: Normal S1 S2, No murmur, rubs/gallops. No JVD, No edema  Respiratory: Lungs clear to auscultation  Gastrointestinal:  Soft, Non-tender, + BS  Lower Extremities: No edema  Psychiatry: Patient is calm. No agitation. Mood & affect appropriate  Skin: No rashes/ ecchymoses/cyanosis/ulcers visualized on the face, hands or feet.    CURRENT MEDICATIONS:  MEDICATIONS  (STANDING):  amLODIPine   Tablet 10 milliGRAM(s) Oral daily  lisinopril 20 milliGRAM(s) Oral daily  tamsulosin 0.4 milliGRAM(s) Oral at bedtime    cyanocobalamin Injectable  heparin  Injectable  risperiDONE   Tablet  sodium chloride 0.9%.    PRN: acetaminophen   Tablet. PRN  haloperidol    Injectable PRN  traMADol PRN      LABS:	 	  CARDIAC MARKERS ( 21 May 2018 18:26 )  x                         10.5   8.8   )-----------( 284      ( 23 May 2018 06:03 )             31.4   N=x    ; L=x        22 May 2018 11:29    136    |  100    |  24.0   ----------------------------<  96     3.6     |  22.0   |  0.99     Ca    8.4        22 May 2018 11:29    proBNP:   Lipid Profile:   HgA1c: TSH: Thyroid Stimulating Hormone, Serum: 1.97 uIU/mL      TELEMETRY: Reviewed : no events. sinus rhythm,   ECG:  Reviewed by me. 	Sinus rhythm, Non-specific ST- T changes     DIAGNOSTIC TESTING:  [ ] Echocardiogram:  Normal wall motion. Normal EF> Grade I Diastolic dysfunction   [ ] Stress Test:  Mild ischemia in circumflex territory.

## 2018-05-23 NOTE — DISCHARGE NOTE ADULT - CARE PROVIDER_API CALL
Dimitris Baeza), Cardiology; Internal Medicine  [address] [id number]  Phone: [phone]  Fax: [phone]

## 2018-05-23 NOTE — DISCHARGE NOTE ADULT - PLAN OF CARE
resolved and prevention hold atenolol  follow up with cardiology stable due to bradycardia improved laxatives prescribed  take as needed to ensure daily bowel movements

## 2018-05-23 NOTE — PROGRESS NOTE ADULT - ATTENDING COMMENTS
No further in-patient cardiac work-up/management is needed.  Follow-up in cardiology office in 2 weeks.   Thank you for allowing me to participate in care of your patient.   Please call as needed.
Thank you for allowing me to participate in care of your patient.   Please call as needed.
dc planning  d/w cardiology-----stress test results pending, loop recorder today  PT evaluation

## 2018-05-23 NOTE — PROGRESS NOTE ADULT - ASSESSMENT
87 yo M seen with son at bedside; h/o HTN, rheumatoid arthritis, CAD p/w syncopal episode and possibly associated sinus bradycardia in the setting of atenolol 100mg daily and possible confusion w/ medical regimen resulting in duplicate dosing.     Recommendations:   HR remains w/in acceptable limits  currently off Atenolol  ILR implant consideration ongoing, patient not consentable at this time ,ct 1:1 for fall risk  change in MS overnight ? delirium , metabolic work-up ongoing  will follow with you 87 yo M seen with son at bedside; h/o HTN, rheumatoid arthritis, CAD p/w syncopal episode and possibly associated sinus bradycardia in the setting of atenolol 100mg daily and possible confusion w/ medical regimen resulting in duplicate dosing. OTTO (-) TTE LVEF 65%    Recommendations:   HR remains w/in acceptable limits  currently off Atenolol  ILR implant consideration ongoing, patient not consentable at this time ,ct 1:1 for fall risk  change in MS overnight ? delirium , metabolic work-up ongoing  will follow with you

## 2018-05-23 NOTE — DISCHARGE NOTE ADULT - CARE PROVIDERS DIRECT ADDRESSES
[email or web address]

## 2018-05-23 NOTE — DISCHARGE NOTE ADULT - CARE PLAN
Principal Discharge DX:	Bradycardia, drug induced  Goal:	resolved and prevention  Assessment and plan of treatment:	hold atenolol  follow up with cardiology  Secondary Diagnosis:	CKD (chronic kidney disease) stage 3, GFR 30-59 ml/min  Assessment and plan of treatment:	stable  Secondary Diagnosis:	Normocytic anemia  Assessment and plan of treatment:	stable  Secondary Diagnosis:	Syncope and collapse  Assessment and plan of treatment:	due to bradycardia  Secondary Diagnosis:	Delirium  Assessment and plan of treatment:	improved Principal Discharge DX:	Bradycardia, drug induced  Goal:	resolved and prevention  Assessment and plan of treatment:	hold atenolol  follow up with cardiology  Secondary Diagnosis:	CKD (chronic kidney disease) stage 3, GFR 30-59 ml/min  Assessment and plan of treatment:	stable  Secondary Diagnosis:	Normocytic anemia  Assessment and plan of treatment:	stable  Secondary Diagnosis:	Syncope and collapse  Assessment and plan of treatment:	due to bradycardia  Secondary Diagnosis:	Constipation  Assessment and plan of treatment:	laxatives prescribed  take as needed to ensure daily bowel movements

## 2018-05-23 NOTE — DISCHARGE NOTE ADULT - SECONDARY DIAGNOSIS.
CKD (chronic kidney disease) stage 3, GFR 30-59 ml/min Normocytic anemia Syncope and collapse Delirium Constipation

## 2018-05-23 NOTE — PROGRESS NOTE ADULT - SUBJECTIVE AND OBJECTIVE BOX
Events noted , patient seen this  morning confused , agitated currently on 1:1      TELE: Sr    MEDICATIONS  (STANDING):  amLODIPine   Tablet 10 milliGRAM(s) Oral daily  cyanocobalamin Injectable 1000 MICROGram(s) SubCutaneous daily  heparin  Injectable 5000 Unit(s) SubCutaneous every 8 hours  lisinopril 20 milliGRAM(s) Oral daily  sodium chloride 0.9%. 1000 milliLiter(s) (75 mL/Hr) IV Continuous <Continuous>  tamsulosin 0.4 milliGRAM(s) Oral at bedtime    MEDICATIONS  (PRN):  acetaminophen   Tablet. 650 milliGRAM(s) Oral every 6 hours PRN Mild Pain (1 - 3) or fever 38C  traMADol 25 milliGRAM(s) Oral four times a day PRN mod -severe pain      Allergies    No Known Allergies    Intolerances      PAST MEDICAL & SURGICAL HISTORY:  BPH (benign prostatic hyperplasia)  Gastritis  Hypertension  Arthritis  S/P cholecystectomy      Vital Signs Last 24 Hrs  T(C): 36.4 (23 May 2018 05:49), Max: 36.8 (22 May 2018 17:19)  T(F): 97.5 (23 May 2018 05:49), Max: 98.3 (22 May 2018 17:19)  HR: 73 (23 May 2018 05:49) (59 - 73)  BP: 137/78 (23 May 2018 05:49) (104/50 - 137/78)  BP(mean): --  RR: 16 (23 May 2018 05:49) (16 - 18)  SpO2: 99% (23 May 2018 05:49) (99% - 100%)    Physical Exam:  Constitutional: NAD, AAOx3  Cardiovascular: +S1S2 RRR  Pulmonary: CTA b/l, unlabored  Abd: soft NTND +BS  Groins: C/D/I bilaterally; no bleeding, hematoma, edema  Extremities: no pedal edema, +distal pulses b/l  Neuro: non focal, GUAJARDO x4    LABS:                        10.5   8.8   )-----------( 284      ( 23 May 2018 06:03 )             31.4     05-    136  |  100  |  24.0<H>  ----------------------------<  96  3.6   |  22.0  |  0.99    Ca    8.4<L>      22 May 2018 11:29  Mg     1.9     05-21      PT/INR - ( 21 May 2018 12:01 )   PT: 11.1 sec;   INR: 1.01 ratio         PTT - ( 21 May 2018 12:01 )  PTT:28.6 sec  Urinalysis Basic - ( 22 May 2018 10:52 )    Color: Yellow / Appearance: Clear / S.005 / pH: x  Gluc: x / Ketone: Negative  / Bili: Negative / Urobili: Negative mg/dL   Blood: x / Protein: Negative mg/dL / Nitrite: Negative   Leuk Esterase: Negative / RBC: x / WBC x   Sq Epi: x / Non Sq Epi: x / Bacteria: x Events noted , patient seen this  morning confused , agitated currently on 1:1      TELE: Sr    MEDICATIONS  (STANDING):  amLODIPine   Tablet 10 milliGRAM(s) Oral daily  cyanocobalamin Injectable 1000 MICROGram(s) SubCutaneous daily  heparin  Injectable 5000 Unit(s) SubCutaneous every 8 hours  lisinopril 20 milliGRAM(s) Oral daily  sodium chloride 0.9%. 1000 milliLiter(s) (75 mL/Hr) IV Continuous <Continuous>  tamsulosin 0.4 milliGRAM(s) Oral at bedtime    MEDICATIONS  (PRN):  acetaminophen   Tablet. 650 milliGRAM(s) Oral every 6 hours PRN Mild Pain (1 - 3) or fever 38C  traMADol 25 milliGRAM(s) Oral four times a day PRN mod -severe pain      Allergies    No Known Allergies    Intolerances      PAST MEDICAL & SURGICAL HISTORY:  BPH (benign prostatic hyperplasia)  Gastritis  Hypertension  Arthritis  S/P cholecystectomy      Vital Signs Last 24 Hrs  T(C): 36.4 (23 May 2018 05:49), Max: 36.8 (22 May 2018 17:19)  T(F): 97.5 (23 May 2018 05:49), Max: 98.3 (22 May 2018 17:19)  HR: 73 (23 May 2018 05:49) (59 - 73)  BP: 137/78 (23 May 2018 05:49) (104/50 - 137/78)  BP(mean): --  RR: 16 (23 May 2018 05:49) (16 - 18)  SpO2: 99% (23 May 2018 05:49) (99% - 100%)    Physical Exam:  Constitutional: NAD, AAOx3  Cardiovascular: +S1S2 RRR  Pulmonary: CTA b/l, unlabored  Abd: soft NTND +BS  Groins: C/D/I bilaterally; no bleeding, hematoma, edema  Extremities: no pedal edema, +distal pulses b/l  Neuro: non focal, GUAJARDO x4    LABS:                        10.5   8.8   )-----------( 284      ( 23 May 2018 06:03 )             31.4     05-    136  |  100  |  24.0<H>  ----------------------------<  96  3.6   |  22.0  |  0.99    Ca    8.4<L>      22 May 2018 11:29  Mg     1.9     05      PT/INR - ( 21 May 2018 12:01 )   PT: 11.1 sec;   INR: 1.01 ratio         PTT - ( 21 May 2018 12:01 )  PTT:28.6 sec  Urinalysis Basic - ( 22 May 2018 10:52 )    Color: Yellow / Appearance: Clear / S.005 / pH: x  Gluc: x / Ketone: Negative  / Bili: Negative / Urobili: Negative mg/dL   Blood: x / Protein: Negative mg/dL / Nitrite: Negative   Leuk Esterase: Negative / RBC: x / WBC x   Sq Epi: x / Non Sq Epi: x / Bacteria: x      < from: TTE Echo Complete w/Doppler (18 @ 15:32) >    Summary:   1. Normal biventricular systolic function. Left ventricular ejection   fraction, by visual estimation, is 65 to 70%.   2. Spectral Doppler shows impaired relaxation pattern of left   ventricular myocardial filling (Grade I diastolic dysfunction).   3. No significant valvular abnormalities.   4. No pericardial effusion.    < end of copied text >

## 2018-05-23 NOTE — PROGRESS NOTE ADULT - PROBLEM SELECTOR PLAN 1
likely dehydration.  Bradycardia : no significant events on telemetry  Stress test: mild ischemia. Medical therapy for now. aspirin 81 and lipitor 20 mg daily.   defer loop recorder outpatient follow up.   p[patient has delirium. likely due to hospitalization.

## 2018-05-23 NOTE — DISCHARGE NOTE ADULT - PATIENT PORTAL LINK FT
You can access the [facility] Patient Portal, offered by [facility], by registering with the following website: [email or web address]

## 2018-05-23 NOTE — DISCHARGE NOTE ADULT - HOSPITAL COURSE
87 yo M w/ hx HTN, osteoarthritis presents for syncopal episode at home. Found to have symptomatic bradycardia. seen by cardiology, underwent stress test and loop recorder placement. no evidence of infection.  episodes of delirium during hospital stay.    dc home   dc planning 35 min. 87 yo M w/ hx HTN, osteoarthritis presents for syncopal episode at home. Found to have symptomatic bradycardia. seen by cardiology, underwent stress test and loop recorder placement planned but patient with agitation and declined the device. no evidence of infection.   dc home   dc planning 35 min.  updated son at bedside re plan of care.    VSS afebrile. aaox3, nad  agitated about being in hospital nonfocal neuro  rrr s1s2 CTAB soft abdomen no LE edema, ambulatory.   declines use of telemetry monitor during hospital stay as well.  complaining of mild abdominal pain and hard stool.

## 2018-05-25 DIAGNOSIS — R69 ILLNESS, UNSPECIFIED: ICD-10-CM

## 2018-06-01 PROCEDURE — G9005: CPT

## 2018-06-01 PROCEDURE — G9001: CPT

## 2018-07-01 ENCOUNTER — OUTPATIENT (OUTPATIENT)
Dept: OUTPATIENT SERVICES | Facility: HOSPITAL | Age: 83
LOS: 1 days | End: 2018-07-01
Payer: MEDICARE

## 2018-07-13 DIAGNOSIS — Z71.89 OTHER SPECIFIED COUNSELING: ICD-10-CM

## 2019-05-01 PROCEDURE — G9005: CPT

## 2020-08-17 NOTE — ED ADULT NURSE REASSESSMENT NOTE - NS ED NURSE REASSESS COMMENT FT1
Group Therapy Note    Date: 8/17/2020    Group Start Time: 0900  Group End Time: 0920  Group Topic: Community Meeting    [address] [place] [address]        Group Therapy Note    Attendees: 11/20         Patient's Goal: To increase interpersonal interaction      Notes:      Status After Intervention:  Improved    Participation Level:  Active Listener and Interactive    Participation Quality: Appropriate, Attentive and Sharing      Speech:  normal      Thought Process/Content: Logical  Linear      Affective Functioning: Congruent      Mood: euthymic      Level of consciousness:  Alert, Oriented x4 and Attentive      Response to Learning: Able to verbalize current knowledge/experience, Able to verbalize/acknowledge new learning, Able to retain information and Progressing to goal      Endings: None Reported    Modes of Intervention: Education, Support, Socialization, Exploration, Clarifying and Problem-solving      Discipline Responsible: Psychoeducational Specialist      Signature:  Juan Manuel Almaguer Pt sleeping comfortably on stretcher, easy arousability, offer no complaints, safety measures maintained.

## 2020-10-07 NOTE — ED ADULT NURSE REASSESSMENT NOTE - NS ED NURSE REASSESS COMMENT FT1
Final Anesthesia Post-op Assessment    Patient: Dionne Nance  Procedure(s) Performed: CYSTOSCOPY RIGHT STENT PLACEMENT - RIGHTURETEROSCOPY NO LASER/RIGHT STENT PLACEMENT - RIGHT  Anesthesia type: MAC    Vitals Value Taken Time   Temp >36 10/07/20 0819   Pulse 89 10/07/20 0819   Resp 12 10/07/20 0819   SpO2 98 10/07/20 0819   /84 10/07/20 0819         Patient Location: bedside  Post-op Vital Signs:stable  Level of Consciousness: participates in exam, awake, alert and oriented  Respiratory Status: spontaneous ventilation  Cardiovascular blood pressure returned to baseline and stable  Hydration: unable to assess  Pain Management: well controlled  Handoff: Handoff to receiving clinician was performed and questions were answered  Vomiting: none   Nausea: None  Airway Patency:patent  Post-op Assessment: awake, alert, appropriately conversant, or baseline, no complications, patient tolerated procedure well with no complications, evidence of recall, dentition within defined limits and moving all extremities      No complications documented.    Unable to give BP medications due to pt being unsure of what he is allergic to and stating " I am allergic to a lot of blood pressure medications and I do not know which ones, I know I normally take atenolol and that is OK".  Dr. Hull made aware, OK to hold BP meds at this time.

## 2022-06-19 NOTE — H&P ADULT - PMH
-This patient meets criteria for sepsis; Organ dysfunction indicated by Acute kidney injury  -Source is likely UTI  -Vital signs reviewed and noted   -WBC 35.07  -Patient received 1L NS bolus x1 in ED  -Lactate is 0.7, continue to monitor and trend   -Blood cultures and urine cultures pending  -Monitor on telemetry  -Responded to fluid resuscitation  -Cont ceftriaxone and follow cultures           Arthritis    BPH (benign prostatic hyperplasia)    Gastritis    Hypertension

## 2023-11-17 NOTE — ED ADULT NURSE NOTE - CINV DISCH EXIT CARE INSTR PROVIDE
Requested Prescriptions   Pending Prescriptions Disp Refills    oxyCODONE IR (ROXICODONE) 10 MG tablet 90 tablet 0     Sig: Take 1 tablet (10 mg) by mouth every 8 hours as needed for moderate to severe pain or severe pain . No further refills until follow-up appointment       There is no refill protocol information for this order        Avery De Paz   Purple Team     no

## 2023-11-20 NOTE — ED PROVIDER NOTE - CPE EDP ENMT NORM
Last Office Visit: 1/27/23  Next Office Visit: TBD  Last Fill Date: 11/16/23  Gina Chavez LPN Cardiology   11/20/2023 11:17 AM    
[facility] Cardiology Refill Guideline reviewed.  Medication meets criteria for refill. Refills sent. Due for fasting labs--orders placed. Bijal Lerner RN Cardiology November 20, 2023, 11:41 AM      
normal...